# Patient Record
Sex: MALE | Race: WHITE | Employment: FULL TIME
[De-identification: names, ages, dates, MRNs, and addresses within clinical notes are randomized per-mention and may not be internally consistent; named-entity substitution may affect disease eponyms.]

---

## 2024-09-28 SDOH — HEALTH STABILITY: PHYSICAL HEALTH: ON AVERAGE, HOW MANY DAYS PER WEEK DO YOU ENGAGE IN MODERATE TO STRENUOUS EXERCISE (LIKE A BRISK WALK)?: 1 DAY

## 2024-09-28 SDOH — HEALTH STABILITY: PHYSICAL HEALTH: ON AVERAGE, HOW MANY MINUTES DO YOU ENGAGE IN EXERCISE AT THIS LEVEL?: 30 MIN

## 2024-09-30 ENCOUNTER — OFFICE VISIT (OUTPATIENT)
Facility: CLINIC | Age: 39
End: 2024-09-30
Payer: COMMERCIAL

## 2024-09-30 VITALS
HEART RATE: 89 BPM | WEIGHT: 258 LBS | BODY MASS INDEX: 36.94 KG/M2 | DIASTOLIC BLOOD PRESSURE: 85 MMHG | SYSTOLIC BLOOD PRESSURE: 124 MMHG | RESPIRATION RATE: 17 BRPM | TEMPERATURE: 97.1 F | OXYGEN SATURATION: 96 % | HEIGHT: 70 IN

## 2024-09-30 DIAGNOSIS — E66.9 OBESITY (BMI 35.0-39.9 WITHOUT COMORBIDITY): ICD-10-CM

## 2024-09-30 DIAGNOSIS — Z13.29 SCREENING FOR THYROID DISORDER: ICD-10-CM

## 2024-09-30 DIAGNOSIS — Z13.228 SCREENING FOR METABOLIC DISORDER: ICD-10-CM

## 2024-09-30 DIAGNOSIS — R68.89 FORGETFULNESS: ICD-10-CM

## 2024-09-30 DIAGNOSIS — Z11.4 SCREENING FOR HIV (HUMAN IMMUNODEFICIENCY VIRUS): ICD-10-CM

## 2024-09-30 DIAGNOSIS — Z13.1 SCREENING FOR DIABETES MELLITUS (DM): ICD-10-CM

## 2024-09-30 DIAGNOSIS — Z11.59 ENCOUNTER FOR HEPATITIS C SCREENING TEST FOR LOW RISK PATIENT: ICD-10-CM

## 2024-09-30 DIAGNOSIS — Z00.00 ANNUAL PHYSICAL EXAM: Primary | ICD-10-CM

## 2024-09-30 DIAGNOSIS — Z13.0 SCREENING FOR IRON DEFICIENCY ANEMIA: ICD-10-CM

## 2024-09-30 DIAGNOSIS — Z13.6 ENCOUNTER FOR SCREENING FOR CORONARY ARTERY DISEASE: ICD-10-CM

## 2024-09-30 PROCEDURE — 99385 PREV VISIT NEW AGE 18-39: CPT | Performed by: INTERNAL MEDICINE

## 2024-09-30 PROCEDURE — 99203 OFFICE O/P NEW LOW 30 MIN: CPT | Performed by: INTERNAL MEDICINE

## 2024-09-30 SDOH — ECONOMIC STABILITY: FOOD INSECURITY: WITHIN THE PAST 12 MONTHS, YOU WORRIED THAT YOUR FOOD WOULD RUN OUT BEFORE YOU GOT MONEY TO BUY MORE.: NEVER TRUE

## 2024-09-30 SDOH — ECONOMIC STABILITY: FOOD INSECURITY: WITHIN THE PAST 12 MONTHS, THE FOOD YOU BOUGHT JUST DIDN'T LAST AND YOU DIDN'T HAVE MONEY TO GET MORE.: NEVER TRUE

## 2024-09-30 SDOH — ECONOMIC STABILITY: INCOME INSECURITY: HOW HARD IS IT FOR YOU TO PAY FOR THE VERY BASICS LIKE FOOD, HOUSING, MEDICAL CARE, AND HEATING?: NOT HARD AT ALL

## 2024-09-30 ASSESSMENT — ENCOUNTER SYMPTOMS
CONSTIPATION: 0
COUGH: 0
SHORTNESS OF BREATH: 0
ABDOMINAL PAIN: 0
DIARRHEA: 0

## 2024-09-30 ASSESSMENT — PATIENT HEALTH QUESTIONNAIRE - PHQ9
SUM OF ALL RESPONSES TO PHQ QUESTIONS 1-9: 0
1. LITTLE INTEREST OR PLEASURE IN DOING THINGS: NOT AT ALL
2. FEELING DOWN, DEPRESSED OR HOPELESS: NOT AT ALL
SUM OF ALL RESPONSES TO PHQ QUESTIONS 1-9: 0
SUM OF ALL RESPONSES TO PHQ9 QUESTIONS 1 & 2: 0

## 2024-09-30 NOTE — PROGRESS NOTES
Subjective:      Patient ID: Rigo Rubio is a 39 y.o. male.    Patient comes to establish PCP.    Patient is concerned about feeling forgetful sometimes.  He has a stressful job as a  in [place], and he has a history of ADHD as a teenager.  He is not taking any medication for ADHD, no complaints.  He is concerned about his weight, he is making lifestyle adjustments including diet and physical activity.  He has not noticed much weight loss yet.  Briefly discussed weight loss options for treatment.  Patient denied fever, chills, sweats, chest pain or shortness of breath.  Denied abdominal pain constipation or blood in stools.  Denied urinary symptoms or unintentional weight loss.  Patient declined genitourinary exam.  Educated about signs and symptoms of testicular cancer.  He has no other complaints or concerns.        ADHD as a teenager  No medication currently.      PAST MEDICAL HISTORY  Medical: as listed.  Surgical: appendectomy  Allergies: denied.  Medication: none.  Family: lung cancer mother and dad, smoking exposure.   Work: police office.  Social: tobacco quit 1 year ago (2023), 6 years total, [place] twice a week, recreational drugs denied.  Sexual: single, 1 child, STDs syphilis treated (age of 26)      ADHD  Pertinent negatives include no abdominal pain, chest pain, chills, coughing, fever or headaches.       Review of Systems   Constitutional:  Negative for chills and fever.   Respiratory:  Negative for cough and shortness of breath.    Cardiovascular:  Negative for chest pain.   Gastrointestinal:  Negative for abdominal pain, constipation and diarrhea.   Genitourinary:  Negative for difficulty urinating.   Neurological:  Negative for headaches.       Objective:   Visit Vitals  /85 (Site: Left Upper Arm)   Pulse 89   Temp 97.1 °F (36.2 °C) (Temporal)   Resp 17   Ht 1.778 m (5' 10\")   Wt 117 kg (258 lb)   SpO2 96%   BMI 37.02 kg/m²        Physical Exam  Constitutional:       Appearance: He

## 2024-10-31 ENCOUNTER — HOSPITAL ENCOUNTER (OUTPATIENT)
Facility: HOSPITAL | Age: 39
Setting detail: SPECIMEN
Discharge: HOME | End: 2024-11-03

## 2024-10-31 LAB — SENTARA SPECIMEN COLLECTION: NORMAL

## 2024-10-31 PROCEDURE — 99001 SPECIMEN HANDLING PT-LAB: CPT

## 2024-11-01 LAB
A/G RATIO: 2 RATIO (ref 1.1–2.6)
ALBUMIN: 4.9 G/DL (ref 3.5–5)
ALP BLD-CCNC: 65 U/L (ref 25–115)
ALT SERPL-CCNC: 128 U/L (ref 5–40)
ANION GAP SERPL CALCULATED.3IONS-SCNC: 13 MMOL/L (ref 3–15)
AST SERPL-CCNC: 58 U/L (ref 10–37)
BASOPHILS ABSOLUTE: 0 K/UL (ref 0–0.2)
BASOPHILS RELATIVE PERCENT: 1 % (ref 0–2)
BILIRUB SERPL-MCNC: 0.7 MG/DL (ref 0.2–1.2)
BUN BLDV-MCNC: 13 MG/DL (ref 6–22)
CALCIUM SERPL-MCNC: 9.5 MG/DL (ref 8.4–10.5)
CHLORIDE BLD-SCNC: 103 MMOL/L (ref 98–110)
CHOLESTEROL, TOTAL: 193 MG/DL (ref 110–200)
CHOLESTEROL/HDL RATIO: 5.7 (ref 0–5)
CO2: 24 MMOL/L (ref 20–32)
CREAT SERPL-MCNC: 0.8 MG/DL (ref 0.5–1.2)
EOSINOPHIL # BLD: 2 % (ref 0–6)
EOSINOPHILS ABSOLUTE: 0.1 K/UL (ref 0–0.5)
ESTIMATED AVERAGE GLUCOSE: 110 MG/DL (ref 91–123)
GFR, ESTIMATED: >60 ML/MIN/1.73 SQ.M.
GLOBULIN: 2.5 G/DL (ref 2–4)
GLUCOSE: 108 MG/DL (ref 70–99)
HBA1C MFR BLD: 5.5 % (ref 4.8–5.6)
HCT VFR BLD CALC: 49 % (ref 36.6–51.9)
HDLC SERPL-MCNC: 34 MG/DL
HEMOGLOBIN: 16.2 G/DL (ref 13.2–17.3)
HEPATITIS C ANTIBODY: NORMAL
HIV -1/0/2 AG/AB WITH REFLEX: NON REACTIVE
HIV INTERPRETATION: NORMAL
LDL CHOLESTEROL: 124 MG/DL (ref 50–99)
LDL/HDL RATIO: 3.7
LYMPHOCYTES # BLD: 38 % (ref 20–45)
LYMPHOCYTES ABSOLUTE: 2.5 K/UL (ref 1–4.8)
MCH RBC QN AUTO: 31 PG (ref 26–34)
MCHC RBC AUTO-ENTMCNC: 33 G/DL (ref 31–36)
MCV RBC AUTO: 93 FL (ref 80–95)
MONOCYTES ABSOLUTE: 0.5 K/UL (ref 0.1–1)
MONOCYTES: 8 % (ref 3–12)
NEUTROPHILS ABSOLUTE: 3.4 K/UL (ref 1.8–7.7)
NEUTROPHILS: 52 % (ref 40–75)
NON-HDL CHOLESTEROL: 159 MG/DL
PDW BLD-RTO: 13.2 % (ref 10–15.5)
PLATELET # BLD: 241 K/UL (ref 140–440)
PMV BLD AUTO: 11.2 FL (ref 9–13)
POTASSIUM SERPL-SCNC: 4.3 MMOL/L (ref 3.5–5.5)
RBC # BLD: 5.3 M/UL (ref 3.8–5.8)
SODIUM BLD-SCNC: 140 MMOL/L (ref 133–145)
T4 FREE: 1.3 NG/DL (ref 0.9–1.8)
TOTAL PROTEIN: 7.4 G/DL (ref 6.4–8.3)
TRIGL SERPL-MCNC: 171 MG/DL (ref 40–149)
TSH SERPL DL<=0.05 MIU/L-ACNC: 0.57 MCU/ML (ref 0.27–4.2)
VLDLC SERPL CALC-MCNC: 34 MG/DL (ref 8–30)
WBC # BLD: 6.5 K/UL (ref 4–11)

## 2024-11-11 ENCOUNTER — OFFICE VISIT (OUTPATIENT)
Facility: CLINIC | Age: 39
End: 2024-11-11
Payer: COMMERCIAL

## 2024-11-11 ENCOUNTER — HOSPITAL ENCOUNTER (OUTPATIENT)
Facility: HOSPITAL | Age: 39
Setting detail: SPECIMEN
Discharge: HOME | End: 2024-11-14

## 2024-11-11 VITALS
HEIGHT: 70 IN | SYSTOLIC BLOOD PRESSURE: 120 MMHG | DIASTOLIC BLOOD PRESSURE: 86 MMHG | HEART RATE: 91 BPM | TEMPERATURE: 97 F | BODY MASS INDEX: 35.07 KG/M2 | WEIGHT: 245 LBS | OXYGEN SATURATION: 95 % | RESPIRATION RATE: 17 BRPM

## 2024-11-11 DIAGNOSIS — L21.9 SEBORRHEIC DERMATITIS: Primary | ICD-10-CM

## 2024-11-11 DIAGNOSIS — E66.812 OBESITY, CLASS II, BMI 35-39.9: ICD-10-CM

## 2024-11-11 DIAGNOSIS — R79.89 ELEVATED LFTS: ICD-10-CM

## 2024-11-11 LAB — SENTARA SPECIMEN COLLECTION: NORMAL

## 2024-11-11 PROCEDURE — 99001 SPECIMEN HANDLING PT-LAB: CPT

## 2024-11-11 PROCEDURE — 99214 OFFICE O/P EST MOD 30 MIN: CPT | Performed by: INTERNAL MEDICINE

## 2024-11-11 RX ORDER — KETOCONAZOLE 20 MG/ML
SHAMPOO TOPICAL
Qty: 120 ML | Refills: 1 | Status: SHIPPED | OUTPATIENT
Start: 2024-11-11

## 2024-11-11 RX ORDER — TOPIRAMATE 25 MG/1
TABLET, FILM COATED ORAL
Qty: 60 TABLET | Refills: 1 | Status: SHIPPED | OUTPATIENT
Start: 2024-11-11

## 2024-11-11 ASSESSMENT — ENCOUNTER SYMPTOMS
COUGH: 0
ABDOMINAL PAIN: 0

## 2024-11-11 NOTE — PROGRESS NOTES
Subjective:      Patient ID: Rigo Rubio is a 39 y.o. male.    Follow-up    Patient coming today to discuss his concerns about obesity.  We discussed different approaches including lifestyle changes, physical activity, medications and surgery.  He would like to start topiramate 25 mg daily.  Discussed secondary effects allergic reactions and side effects, if any presents to go to the ED.  He agreed.  Provided meal prep guideline.  Provided dietitian contact number.  Referral to weight loss program.  He is complaining of dandruff and mild rash hairline.  Consistent with seborrheic dermatitis, recommended ketoconazole shampoo.  Avoid contact with the eyes or overuse.  Last blood work revealed elevated liver enzymes, will do workup.  No other complaints or concerns.      Obesity BMI 35  Started topiramate 25 mg daily on 11/11/2024  Referral weight loss clinic.    ADHD as a teenager  No medication currently.      PAST MEDICAL HISTORY  Medical: as listed.  Surgical: appendectomy  Allergies: denied.  Medication: none.  Family: lung cancer mother and dad, smoking exposure.   Work: police office.  Social: tobacco quit 1 year ago (2023), 6 years total, [place] twice a week, recreational drugs denied.  Sexual: single, 1 child, STDs syphilis treated (age of 26)      Weight Loss  Pertinent negatives include no abdominal pain, chest pain, chills, coughing, fever or headaches.   Rash  Pertinent negatives include no cough or fever.   ADHD  Pertinent negatives include no abdominal pain, chest pain, chills, coughing, fever or headaches.       Review of Systems   Constitutional:  Positive for weight loss. Negative for chills and fever.   Respiratory:  Negative for cough.    Cardiovascular:  Negative for chest pain.   Gastrointestinal:  Negative for abdominal pain.   Neurological:  Negative for headaches.       Objective:   Visit Vitals  /86 (Site: Left Upper Arm, Position: Sitting)   Pulse 91   Temp 97 °F (36.1 °C) (Temporal)   Resp

## 2024-11-14 LAB
ANA SCREEN: NEGATIVE
CERULOPLASMIN: 23 MG/DL (ref 14–30)
FERRITIN: 343 NG/ML (ref 22–322)
GLIADIN ANTIBODIES IGA: 1.1 U/ML
GLIADIN ANTIBODIES IGG: <0.5 U/ML
HBV SURFACE AB TITR SER: 139 IU/L
IMMUNOGLOBULIN A, QT.: 459 MG/DL (ref 70–400)
MITOCHONDRIAL M2 AB, IGG: <=20 U
TISSUE TRANSGLUTAMINASE ANTIBODY IGG: <0.8 U/ML
TISSUE TRANSGLUTAMINASE IGA: <0.5 U/ML

## 2024-11-17 ENCOUNTER — CLINICAL DOCUMENTATION (OUTPATIENT)
Facility: CLINIC | Age: 39
End: 2024-11-17

## 2025-03-20 ENCOUNTER — OFFICE VISIT (OUTPATIENT)
Facility: CLINIC | Age: 40
End: 2025-03-20
Payer: COMMERCIAL

## 2025-03-20 VITALS
DIASTOLIC BLOOD PRESSURE: 83 MMHG | BODY MASS INDEX: 37.11 KG/M2 | HEART RATE: 79 BPM | TEMPERATURE: 97.3 F | SYSTOLIC BLOOD PRESSURE: 121 MMHG | WEIGHT: 259.2 LBS | HEIGHT: 70 IN | RESPIRATION RATE: 96 BRPM

## 2025-03-20 DIAGNOSIS — L21.9 SEBORRHEIC DERMATITIS: ICD-10-CM

## 2025-03-20 DIAGNOSIS — E66.812 OBESITY, CLASS II, BMI 35-39.9: Primary | ICD-10-CM

## 2025-03-20 DIAGNOSIS — R79.89 ELEVATED LFTS: ICD-10-CM

## 2025-03-20 DIAGNOSIS — M25.512 ACUTE PAIN OF LEFT SHOULDER: ICD-10-CM

## 2025-03-20 PROCEDURE — 99213 OFFICE O/P EST LOW 20 MIN: CPT | Performed by: INTERNAL MEDICINE

## 2025-03-20 RX ORDER — KETOCONAZOLE 20 MG/ML
SHAMPOO, SUSPENSION TOPICAL
Qty: 120 ML | Refills: 2 | Status: SHIPPED | OUTPATIENT
Start: 2025-03-20

## 2025-03-20 RX ORDER — OXYCODONE HYDROCHLORIDE 5 MG/1
5 TABLET ORAL EVERY 6 HOURS PRN
COMMUNITY
Start: 2025-03-19

## 2025-03-20 RX ORDER — TRAMADOL HYDROCHLORIDE 50 MG/1
50 TABLET ORAL EVERY 6 HOURS PRN
COMMUNITY
Start: 2025-03-16 | End: 2025-03-20

## 2025-03-20 SDOH — ECONOMIC STABILITY: FOOD INSECURITY: WITHIN THE PAST 12 MONTHS, THE FOOD YOU BOUGHT JUST DIDN'T LAST AND YOU DIDN'T HAVE MONEY TO GET MORE.: NEVER TRUE

## 2025-03-20 SDOH — ECONOMIC STABILITY: FOOD INSECURITY: WITHIN THE PAST 12 MONTHS, YOU WORRIED THAT YOUR FOOD WOULD RUN OUT BEFORE YOU GOT MONEY TO BUY MORE.: NEVER TRUE

## 2025-03-20 ASSESSMENT — PATIENT HEALTH QUESTIONNAIRE - PHQ9
1. LITTLE INTEREST OR PLEASURE IN DOING THINGS: NOT AT ALL
SUM OF ALL RESPONSES TO PHQ QUESTIONS 1-9: 0
2. FEELING DOWN, DEPRESSED OR HOPELESS: NOT AT ALL
SUM OF ALL RESPONSES TO PHQ QUESTIONS 1-9: 0

## 2025-03-20 ASSESSMENT — ENCOUNTER SYMPTOMS
COUGH: 0
ABDOMINAL PAIN: 0

## 2025-03-20 NOTE — PROGRESS NOTES
Loss  Pertinent negatives include no abdominal pain, chest pain, chills, coughing, fever or headaches.   Rash  Pertinent negatives include no cough or fever.   ADHD  Pertinent negatives include no abdominal pain, chest pain, chills, coughing, fever or headaches.       Review of Systems   Constitutional:  Positive for weight loss. Negative for chills and fever.   Respiratory:  Negative for cough.    Cardiovascular:  Negative for chest pain.   Gastrointestinal:  Negative for abdominal pain.   Neurological:  Negative for headaches.       Objective:   Visit Vitals  /83 (BP Cuff Size: Large Adult)   Pulse 79   Temp 97.3 °F (36.3 °C) (Temporal)   Resp (!) 96   Ht 1.778 m (5' 10\")   Wt 117.6 kg (259 lb 3.2 oz)   BMI 37.19 kg/m²        Physical Exam  Cardiovascular:      Rate and Rhythm: Normal rate and regular rhythm.      Heart sounds: S1 normal and S2 normal.      No S3 or S4 sounds.   Pulmonary:      Effort: Pulmonary effort is normal.      Breath sounds: Normal breath sounds.      Comments: No crackles or wheezing.  Abdominal:      General: Abdomen is flat. Bowel sounds are normal.   Neurological:      Mental Status: He is alert.         Assessment:       ICD-10-CM    1. Obesity, Class II, BMI 35-39.9  E66.812 Semaglutide-Weight Management (WEGOVY) 0.25 MG/0.5ML SOAJ SC injection      2. Seborrheic dermatitis  L21.9 ketoconazole (NIZORAL) 2 % shampoo      3. Elevated LFTs  R79.89 [facility] ([facility])      4. Acute pain of left shoulder  M25.512 [facility] - Maximo Marie MD, Orthopedic Surgery(General/Arthroscopic/Shoulder & Elbow), [place] ([facility])              Plan:   Return in about 7 months (around 11/3/2025) for Annual exam or before as needed.      Alonso Vera MD

## 2025-03-25 ENCOUNTER — TELEPHONE (OUTPATIENT)
Age: 40
End: 2025-03-25

## 2025-03-27 ENCOUNTER — TELEPHONE (OUTPATIENT)
Age: 40
End: 2025-03-27

## 2025-04-01 ENCOUNTER — TELEPHONE (OUTPATIENT)
Facility: CLINIC | Age: 40
End: 2025-04-01